# Patient Record
Sex: FEMALE | Race: BLACK OR AFRICAN AMERICAN | NOT HISPANIC OR LATINO
[De-identification: names, ages, dates, MRNs, and addresses within clinical notes are randomized per-mention and may not be internally consistent; named-entity substitution may affect disease eponyms.]

---

## 2019-03-18 ENCOUNTER — OTHER (OUTPATIENT)
Age: 24
End: 2019-03-18

## 2019-03-20 ENCOUNTER — APPOINTMENT (OUTPATIENT)
Dept: MATERNAL FETAL MEDICINE | Facility: CLINIC | Age: 24
End: 2019-03-20
Payer: MEDICAID

## 2019-03-20 ENCOUNTER — ASOB RESULT (OUTPATIENT)
Age: 24
End: 2019-03-20

## 2019-03-20 ENCOUNTER — LABORATORY RESULT (OUTPATIENT)
Age: 24
End: 2019-03-20

## 2019-03-20 ENCOUNTER — APPOINTMENT (OUTPATIENT)
Dept: ANTEPARTUM | Facility: CLINIC | Age: 24
End: 2019-03-20
Payer: MEDICAID

## 2019-03-20 VITALS
DIASTOLIC BLOOD PRESSURE: 84 MMHG | BODY MASS INDEX: 31.31 KG/M2 | HEIGHT: 62 IN | SYSTOLIC BLOOD PRESSURE: 122 MMHG | WEIGHT: 170.13 LBS

## 2019-03-20 DIAGNOSIS — Z34.81 ENCOUNTER FOR SUPERVISION OF OTHER NORMAL PREGNANCY, FIRST TRIMESTER: ICD-10-CM

## 2019-03-20 PROCEDURE — 76801 OB US < 14 WKS SINGLE FETUS: CPT

## 2019-03-20 PROCEDURE — 99204 OFFICE O/P NEW MOD 45 MIN: CPT

## 2019-03-20 PROCEDURE — 36416 COLLJ CAPILLARY BLOOD SPEC: CPT

## 2019-03-20 PROCEDURE — 76813 OB US NUCHAL MEAS 1 GEST: CPT

## 2019-03-22 ENCOUNTER — OTHER (OUTPATIENT)
Age: 24
End: 2019-03-22

## 2019-03-22 ENCOUNTER — LABORATORY RESULT (OUTPATIENT)
Age: 24
End: 2019-03-22

## 2019-03-25 ENCOUNTER — LABORATORY RESULT (OUTPATIENT)
Age: 24
End: 2019-03-25

## 2019-04-02 ENCOUNTER — RESULT REVIEW (OUTPATIENT)
Age: 24
End: 2019-04-02

## 2019-04-02 DIAGNOSIS — O12.12 GESTATIONAL PROTEINURIA, SECOND TRIMESTER: ICD-10-CM

## 2019-04-02 DIAGNOSIS — R74.0 NONSPECIFIC ELEVATION OF LEVELS OF TRANSAMINASE AND LACTIC ACID DEHYDROGENASE [LDH]: ICD-10-CM

## 2019-04-03 ENCOUNTER — APPOINTMENT (OUTPATIENT)
Dept: ULTRASOUND IMAGING | Facility: HOSPITAL | Age: 24
End: 2019-04-03

## 2019-04-03 ENCOUNTER — NON-APPOINTMENT (OUTPATIENT)
Age: 24
End: 2019-04-03

## 2019-04-03 DIAGNOSIS — Z91.09 OTHER ALLERGY STATUS, OTHER THAN TO DRUGS AND BIOLOGICAL SUBSTANCES: ICD-10-CM

## 2019-04-03 DIAGNOSIS — O23.40 UNSPECIFIED INFECTION OF URINARY TRACT IN PREGNANCY, UNSPECIFIED TRIMESTER: ICD-10-CM

## 2019-04-03 DIAGNOSIS — M87.9 OSTEONECROSIS, UNSPECIFIED: ICD-10-CM

## 2019-04-03 DIAGNOSIS — Z92.89 PERSONAL HISTORY OF OTHER MEDICAL TREATMENT: ICD-10-CM

## 2019-04-03 DIAGNOSIS — Z78.9 OTHER SPECIFIED HEALTH STATUS: ICD-10-CM

## 2019-04-03 DIAGNOSIS — Z82.49 FAMILY HISTORY OF ISCHEMIC HEART DISEASE AND OTHER DISEASES OF THE CIRCULATORY SYSTEM: ICD-10-CM

## 2019-04-03 DIAGNOSIS — F25.9 SCHIZOAFFECTIVE DISORDER, UNSPECIFIED: ICD-10-CM

## 2019-04-03 DIAGNOSIS — O99.019 ANEMIA COMPLICATING PREGNANCY, UNSPECIFIED TRIMESTER: ICD-10-CM

## 2019-04-03 DIAGNOSIS — D57.1 ANEMIA COMPLICATING PREGNANCY, UNSPECIFIED TRIMESTER: ICD-10-CM

## 2019-04-08 ENCOUNTER — LABORATORY RESULT (OUTPATIENT)
Age: 24
End: 2019-04-08

## 2019-04-08 ENCOUNTER — APPOINTMENT (OUTPATIENT)
Dept: ANTEPARTUM | Facility: CLINIC | Age: 24
End: 2019-04-08
Payer: MEDICAID

## 2019-04-08 ENCOUNTER — OUTPATIENT (OUTPATIENT)
Dept: OUTPATIENT SERVICES | Facility: HOSPITAL | Age: 24
LOS: 1 days | End: 2019-04-08
Payer: MEDICAID

## 2019-04-08 ENCOUNTER — ASOB RESULT (OUTPATIENT)
Age: 24
End: 2019-04-08

## 2019-04-08 ENCOUNTER — NON-APPOINTMENT (OUTPATIENT)
Age: 24
End: 2019-04-08

## 2019-04-08 ENCOUNTER — APPOINTMENT (OUTPATIENT)
Dept: MATERNAL FETAL MEDICINE | Facility: CLINIC | Age: 24
End: 2019-04-08
Payer: MEDICAID

## 2019-04-08 VITALS
BODY MASS INDEX: 31.68 KG/M2 | WEIGHT: 173.19 LBS | SYSTOLIC BLOOD PRESSURE: 120 MMHG | DIASTOLIC BLOOD PRESSURE: 78 MMHG

## 2019-04-08 LAB
BILIRUB UR QL STRIP: NORMAL
GLUCOSE UR-MCNC: NORMAL
HCG UR QL: 8 EU/DL
HGB UR QL STRIP.AUTO: NORMAL
KETONES UR-MCNC: NORMAL
LEUKOCYTE ESTERASE UR QL STRIP: NORMAL
NITRITE UR QL STRIP: NORMAL
PH UR STRIP: 7
SP GR UR STRIP: 1.01

## 2019-04-08 PROCEDURE — G0463: CPT

## 2019-04-08 PROCEDURE — 99213 OFFICE O/P EST LOW 20 MIN: CPT | Mod: GE

## 2019-04-08 PROCEDURE — 76805 OB US >/= 14 WKS SNGL FETUS: CPT | Mod: 26

## 2019-04-18 DIAGNOSIS — O09.899 SUPERVISION OF OTHER HIGH RISK PREGNANCIES, UNSPECIFIED TRIMESTER: ICD-10-CM

## 2019-04-22 DIAGNOSIS — O99.019 ANEMIA COMPLICATING PREGNANCY, UNSPECIFIED TRIMESTER: ICD-10-CM

## 2019-04-22 DIAGNOSIS — O09.92 SUPERVISION OF HIGH RISK PREGNANCY, UNSPECIFIED, SECOND TRIMESTER: ICD-10-CM

## 2019-04-22 DIAGNOSIS — O99.519 DISEASES OF THE RESPIRATORY SYSTEM COMPLICATING PREGNANCY, UNSPECIFIED TRIMESTER: ICD-10-CM

## 2019-04-25 PROBLEM — O23.40 UTI IN PREGNANCY: Status: ACTIVE | Noted: 2019-04-25

## 2019-04-25 PROBLEM — Z91.09 ALLERGY TO ENVIRONMENTAL FACTORS: Status: ACTIVE | Noted: 2019-04-25

## 2019-04-25 RX ORDER — CEPHALEXIN 500 MG/1
500 CAPSULE ORAL
Qty: 14 | Refills: 0 | Status: COMPLETED | COMMUNITY
Start: 2019-04-25 | End: 2019-05-02

## 2019-05-01 ENCOUNTER — APPOINTMENT (OUTPATIENT)
Dept: PULMONOLOGY | Facility: CLINIC | Age: 24
End: 2019-05-01
Payer: MEDICAID

## 2019-05-01 VITALS
RESPIRATION RATE: 18 BRPM | HEART RATE: 102 BPM | DIASTOLIC BLOOD PRESSURE: 63 MMHG | HEIGHT: 62 IN | TEMPERATURE: 97.6 F | BODY MASS INDEX: 31.83 KG/M2 | SYSTOLIC BLOOD PRESSURE: 100 MMHG | WEIGHT: 173 LBS

## 2019-05-01 DIAGNOSIS — O99.519 DISEASES OF THE RESPIRATORY SYSTEM COMPLICATING PREGNANCY, UNSPECIFIED TRIMESTER: ICD-10-CM

## 2019-05-01 DIAGNOSIS — J45.909 UNSPECIFIED ASTHMA, UNCOMPLICATED: ICD-10-CM

## 2019-05-01 DIAGNOSIS — D57.1 SICKLE-CELL DISEASE W/OUT CRISIS: ICD-10-CM

## 2019-05-01 DIAGNOSIS — J45.909 DISEASES OF THE RESPIRATORY SYSTEM COMPLICATING PREGNANCY, UNSPECIFIED TRIMESTER: ICD-10-CM

## 2019-05-01 PROCEDURE — 99213 OFFICE O/P EST LOW 20 MIN: CPT | Mod: GC

## 2019-05-01 RX ORDER — FLUTICASONE FUROATE AND VILANTEROL TRIFENATATE 100; 25 UG/1; UG/1
100-25 POWDER RESPIRATORY (INHALATION)
Refills: 0 | Status: ACTIVE | COMMUNITY

## 2019-05-01 RX ORDER — ESCITALOPRAM OXALATE 5 MG/1
TABLET, FILM COATED ORAL
Refills: 0 | Status: ACTIVE | COMMUNITY

## 2019-05-01 RX ORDER — RISPERIDONE 0.5 MG/1
TABLET ORAL
Refills: 0 | Status: ACTIVE | COMMUNITY

## 2019-05-01 NOTE — HISTORY OF PRESENT ILLNESS
[FreeTextEntry1] : 24F with SSD (SS) and asthma, 19 weeks pregnant presents for the first time to pulmonary clinic for worsening SOB for the last 3-4 months. Patient reports that she has increased sob and has increased her proair use for the last 3 months. Breathing can be worse in the morning and notes poor sleep hygiene. + chest pain since December, in the middle of chest, no bad currently. Denies wheezing, cough, sputum, hemoptysis or other complaints. Symptoms worse when laying flat. Last hospitalized for SSD in January and received steroids during the admission. no history of intubations/ICU stays. On Breo and Proair\par \par Sees Dr. Wayne Bernard PMD

## 2019-05-01 NOTE — PHYSICAL EXAM
[General Appearance - Well Nourished] : well nourished [General Appearance - Well Developed] : well developed [Normal Conjunctiva] : the conjunctiva exhibited no abnormalities [Normal Oropharynx] : normal oropharynx [Heart Rate And Rhythm] : heart rate and rhythm were normal [Heart Sounds] : normal S1 and S2 [Respiration, Rhythm And Depth] : normal respiratory rhythm and effort [] : no respiratory distress [Nail Clubbing] : no clubbing of the fingernails [Cyanosis, Localized] : no localized cyanosis [FreeTextEntry1] : no wheezing

## 2019-05-01 NOTE — ASSESSMENT
[FreeTextEntry1] : 24F with SSD (SS) and asthma, 19 weeks pregnant presents for the first time to pulmonary clinic for worsening SOB for the last 3-4 months\par \par #Asthma during pregnancy\par No evidence of asthma exacerbation at this time but unclear if her increased symptoms is entirely due to pregnancy or underlying asthma. Will obtain PFTs and follow up patient in one week. She will bring her Breo with her to confirm dose and review inhaler technique which seems appropriate today. If symptoms remain out of proportion would consider TTE to eval for phtn in the setting of sickle cell disease. Low suspicion for sickle cell related anemia and SOB\par -PFTs and follow up in 1 week

## 2019-05-01 NOTE — REVIEW OF SYSTEMS
[Recent Wt Gain (___ Lbs)] : recent [unfilled] ~Ulb weight gain [Sinus Problems] : sinus problems [Nasal Congestion] : nasal congestion [Dyspnea] : dyspnea [Negative] : Allergy/Immunology [As Noted in HPI] : as noted in HPI [Fever] : no fever [Hemoptysis] : no hemoptysis

## 2019-05-10 ENCOUNTER — LABORATORY RESULT (OUTPATIENT)
Age: 24
End: 2019-05-10

## 2019-05-10 ENCOUNTER — APPOINTMENT (OUTPATIENT)
Dept: ANTEPARTUM | Facility: CLINIC | Age: 24
End: 2019-05-10
Payer: MEDICAID

## 2019-05-10 ENCOUNTER — OUTPATIENT (OUTPATIENT)
Dept: OUTPATIENT SERVICES | Facility: HOSPITAL | Age: 24
LOS: 1 days | End: 2019-05-10
Payer: MEDICAID

## 2019-05-10 ENCOUNTER — NON-APPOINTMENT (OUTPATIENT)
Age: 24
End: 2019-05-10

## 2019-05-10 ENCOUNTER — APPOINTMENT (OUTPATIENT)
Dept: MATERNAL FETAL MEDICINE | Facility: CLINIC | Age: 24
End: 2019-05-10
Payer: MEDICAID

## 2019-05-10 ENCOUNTER — ASOB RESULT (OUTPATIENT)
Age: 24
End: 2019-05-10

## 2019-05-10 VITALS
WEIGHT: 174 LBS | DIASTOLIC BLOOD PRESSURE: 65 MMHG | HEIGHT: 62 IN | BODY MASS INDEX: 32.02 KG/M2 | SYSTOLIC BLOOD PRESSURE: 105 MMHG

## 2019-05-10 DIAGNOSIS — O09.899 SUPERVISION OF OTHER HIGH RISK PREGNANCIES, UNSPECIFIED TRIMESTER: ICD-10-CM

## 2019-05-10 DIAGNOSIS — O09.90 SUPERVISION OF HIGH RISK PREGNANCY, UNSPECIFIED, UNSPECIFIED TRIMESTER: ICD-10-CM

## 2019-05-10 DIAGNOSIS — K21.9 GASTRO-ESOPHAGEAL REFLUX DISEASE W/OUT ESOPHAGITIS: ICD-10-CM

## 2019-05-10 LAB
BILIRUB UR QL STRIP: NORMAL
CLARITY UR: NORMAL
COLLECTION METHOD: NORMAL
GLUCOSE UR-MCNC: NORMAL
HCG UR QL: 2 EU/DL
HGB UR QL STRIP.AUTO: NORMAL
KETONES UR-MCNC: NORMAL
LEUKOCYTE ESTERASE UR QL STRIP: NORMAL
NITRITE UR QL STRIP: NORMAL
PH UR STRIP: 6
PROT UR STRIP-MCNC: NORMAL
SP GR UR STRIP: 1.01

## 2019-05-10 PROCEDURE — G0463: CPT

## 2019-05-10 PROCEDURE — 99213 OFFICE O/P EST LOW 20 MIN: CPT | Mod: GE,25

## 2019-05-10 PROCEDURE — 81003 URINALYSIS AUTO W/O SCOPE: CPT

## 2019-05-10 PROCEDURE — 81003 URINALYSIS AUTO W/O SCOPE: CPT | Mod: NC,QW

## 2019-05-10 PROCEDURE — 76817 TRANSVAGINAL US OBSTETRIC: CPT

## 2019-05-10 PROCEDURE — 76811 OB US DETAILED SNGL FETUS: CPT

## 2019-05-10 PROCEDURE — 76817 TRANSVAGINAL US OBSTETRIC: CPT | Mod: 26

## 2019-05-10 PROCEDURE — 76811 OB US DETAILED SNGL FETUS: CPT | Mod: 26

## 2019-05-10 RX ORDER — DOCUSATE SODIUM 100 MG/1
100 CAPSULE ORAL 3 TIMES DAILY
Qty: 100 | Refills: 3 | Status: ACTIVE | COMMUNITY
Start: 2019-05-10 | End: 1900-01-01

## 2019-05-10 RX ORDER — CHLORHEXIDINE GLUCONATE 4 %
400 LIQUID (ML) TOPICAL DAILY
Qty: 30 | Refills: 4 | Status: ACTIVE | COMMUNITY
Start: 2019-05-10 | End: 1900-01-01

## 2019-05-10 RX ORDER — PANTOPRAZOLE 40 MG/1
40 TABLET, DELAYED RELEASE ORAL DAILY
Qty: 30 | Refills: 0 | Status: ACTIVE | COMMUNITY
Start: 2019-05-10 | End: 1900-01-01

## 2019-05-14 ENCOUNTER — RESULT REVIEW (OUTPATIENT)
Age: 24
End: 2019-05-14

## 2019-05-14 LAB
MEV IGG FLD QL IA: 48 AU/ML
MEV IGG+IGM SER-IMP: POSITIVE

## 2019-05-15 ENCOUNTER — APPOINTMENT (OUTPATIENT)
Dept: NEPHROLOGY | Facility: CLINIC | Age: 24
End: 2019-05-15

## 2019-05-18 DIAGNOSIS — O99.012 ANEMIA COMPLICATING PREGNANCY, SECOND TRIMESTER: ICD-10-CM

## 2019-05-18 DIAGNOSIS — K21.9 GASTRO-ESOPHAGEAL REFLUX DISEASE WITHOUT ESOPHAGITIS: ICD-10-CM

## 2019-05-31 ENCOUNTER — APPOINTMENT (OUTPATIENT)
Dept: MATERNAL FETAL MEDICINE | Facility: CLINIC | Age: 24
End: 2019-05-31

## 2019-06-04 ENCOUNTER — NON-APPOINTMENT (OUTPATIENT)
Age: 24
End: 2019-06-04

## 2019-06-04 ENCOUNTER — APPOINTMENT (OUTPATIENT)
Dept: INTERNAL MEDICINE | Facility: CLINIC | Age: 24
End: 2019-06-04

## 2019-06-05 ENCOUNTER — APPOINTMENT (OUTPATIENT)
Dept: PULMONOLOGY | Facility: CLINIC | Age: 24
End: 2019-06-05